# Patient Record
Sex: FEMALE | Race: WHITE | NOT HISPANIC OR LATINO
[De-identification: names, ages, dates, MRNs, and addresses within clinical notes are randomized per-mention and may not be internally consistent; named-entity substitution may affect disease eponyms.]

---

## 2017-01-03 ENCOUNTER — RX ONLY (OUTPATIENT)
Age: 47
Setting detail: RX ONLY
End: 2017-01-03

## 2017-01-03 ENCOUNTER — APPOINTMENT (RX ONLY)
Dept: URBAN - NONMETROPOLITAN AREA CLINIC 28 | Facility: CLINIC | Age: 47
Setting detail: DERMATOLOGY
End: 2017-01-03

## 2017-01-03 VITALS — HEART RATE: 88 BPM | SYSTOLIC BLOOD PRESSURE: 117 MMHG | DIASTOLIC BLOOD PRESSURE: 75 MMHG

## 2017-01-03 DIAGNOSIS — L70.0 ACNE VULGARIS: ICD-10-CM

## 2017-01-03 PROCEDURE — ? COUNSELING

## 2017-01-03 PROCEDURE — ? OTHER

## 2017-01-03 PROCEDURE — ? TREATMENT REGIMEN

## 2017-01-03 PROCEDURE — ? FOLLOW UP FOR NEXT VISIT

## 2017-01-03 PROCEDURE — 99213 OFFICE O/P EST LOW 20 MIN: CPT

## 2017-01-03 RX ORDER — SULFACETAMIDE SODIUM, SULFUR 98; 48 MG/G; MG/G
LIQUID TOPICAL
Qty: 1 | Refills: 3 | Status: ERX

## 2017-01-03 RX ORDER — DOXYCYCLINE 40 MG/1
CAPSULE ORAL
Qty: 30 | Refills: 1 | Status: ERX

## 2017-01-03 RX ORDER — TRETINOIN 0.25 MG/G
CREAM TOPICAL QHS
Qty: 1 | Refills: 3 | Status: ERX

## 2017-01-03 RX ORDER — SPIRONOLACTONE 25 MG/1
TABLET, FILM COATED ORAL
Qty: 60 | Refills: 3 | Status: ERX

## 2017-01-03 ASSESSMENT — LOCATION DETAILED DESCRIPTION DERM: LOCATION DETAILED: LEFT MEDIAL MALAR CHEEK

## 2017-01-03 ASSESSMENT — LOCATION SIMPLE DESCRIPTION DERM: LOCATION SIMPLE: LEFT CHEEK

## 2017-01-03 ASSESSMENT — LOCATION ZONE DERM: LOCATION ZONE: FACE

## 2017-01-03 NOTE — PROCEDURE: TREATMENT REGIMEN
Plan: Consider discontinuing oracea at follow up
Detail Level: Zone
Continue Regimen: Spironolactone \\nPlexion \\nTretinoin \\nOracea

## 2017-01-03 NOTE — PROCEDURE: OTHER
Other (Free Text): Discussed microneedling and fraxel laser resurfacing for acne scars
Note Text (......Xxx Chief Complaint.): This diagnosis correlates with the
Detail Level: Zone

## 2017-04-10 ENCOUNTER — RX ONLY (OUTPATIENT)
Age: 47
Setting detail: RX ONLY
End: 2017-04-10

## 2017-04-10 ENCOUNTER — APPOINTMENT (RX ONLY)
Dept: URBAN - NONMETROPOLITAN AREA CLINIC 28 | Facility: CLINIC | Age: 47
Setting detail: DERMATOLOGY
End: 2017-04-10

## 2017-04-10 DIAGNOSIS — L91.8 OTHER HYPERTROPHIC DISORDERS OF THE SKIN: ICD-10-CM

## 2017-04-10 DIAGNOSIS — L70.0 ACNE VULGARIS: ICD-10-CM

## 2017-04-10 PROCEDURE — ? SKIN TAG REMOVAL

## 2017-04-10 PROCEDURE — ? FOLLOW UP FOR NEXT VISIT

## 2017-04-10 PROCEDURE — 99212 OFFICE O/P EST SF 10 MIN: CPT | Mod: 25

## 2017-04-10 PROCEDURE — ? COUNSELING

## 2017-04-10 PROCEDURE — 11200 RMVL SKIN TAGS UP TO&INC 15: CPT

## 2017-04-10 PROCEDURE — ? TREATMENT REGIMEN

## 2017-04-10 PROCEDURE — ? OTHER

## 2017-04-10 RX ORDER — TRETINOIN 0.25 MG/G
CREAM TOPICAL QHS
Qty: 1 | Refills: 11 | Status: ERX

## 2017-04-10 RX ORDER — SULFACETAMIDE SODIUM, SULFUR 98; 48 MG/G; MG/G
LIQUID TOPICAL
Qty: 1 | Refills: 11 | Status: ERX

## 2017-04-10 ASSESSMENT — LOCATION ZONE DERM
LOCATION ZONE: FACE
LOCATION ZONE: AXILLAE

## 2017-04-10 ASSESSMENT — LOCATION SIMPLE DESCRIPTION DERM
LOCATION SIMPLE: LEFT CHEEK
LOCATION SIMPLE: RIGHT AXILLARY VAULT

## 2017-04-10 ASSESSMENT — LOCATION DETAILED DESCRIPTION DERM
LOCATION DETAILED: LEFT MEDIAL MALAR CHEEK
LOCATION DETAILED: RIGHT AXILLARY VAULT

## 2017-04-10 NOTE — PROCEDURE: TREATMENT REGIMEN
Discontinue Regimen: Spironolactone \\nOracea
Detail Level: Zone
Plan: Consider discontinuing oracea at follow up
Continue Regimen: Plexion \\nTretinoin

## 2017-04-10 NOTE — PROCEDURE: SKIN TAG REMOVAL
Medical Necessity Information: It is in your best interest to select a reason for this procedure from the list below. All of these items fulfill various CMS LCD requirements except the new and changing color options.
Anesthesia Volume In Cc: 3
Medical Necessity Clause: This procedure was medically necessary because the lesions that were treated were:
Detail Level: Detailed
Consent: Written consent obtained and the risks of skin tag removal was reviewed with the patient including but not limited to bleeding, pigmentary change, infection, pain, and remote possibility of scarring.
Include Z78.9 (Other Specified Conditions Influencing Health Status) As An Associated Diagnosis?: No
Anesthesia Type: 1% lidocaine with epinephrine

## 2017-04-10 NOTE — PROCEDURE: OTHER
Other (Free Text): Discussed microneedling and fraxel laser resurfacing for acne scars
Detail Level: Zone
Note Text (......Xxx Chief Complaint.): This diagnosis correlates with the